# Patient Record
Sex: FEMALE | Race: WHITE
[De-identification: names, ages, dates, MRNs, and addresses within clinical notes are randomized per-mention and may not be internally consistent; named-entity substitution may affect disease eponyms.]

---

## 2022-07-27 ENCOUNTER — HOSPITAL ENCOUNTER (OUTPATIENT)
Dept: HOSPITAL 47 - LABWHC1 | Age: 62
Discharge: HOME | End: 2022-07-27
Attending: FAMILY MEDICINE
Payer: MEDICARE

## 2022-07-27 DIAGNOSIS — Z01.818: Primary | ICD-10-CM

## 2022-07-27 LAB
ALBUMIN SERPL-MCNC: 4.5 G/DL (ref 3.8–4.9)
ALBUMIN/GLOB SERPL: 2.25 G/DL (ref 1.6–3.17)
ALP SERPL-CCNC: 77 U/L (ref 41–126)
ALT SERPL-CCNC: 20 U/L (ref 8–44)
ANION GAP SERPL CALC-SCNC: 8.7 MMOL/L (ref 10–18)
APTT BLD: 24.7 SEC (ref 22–30)
AST SERPL-CCNC: 27 U/L (ref 13–35)
BASOPHILS # BLD AUTO: 0.05 X 10*3/UL (ref 0–0.1)
BASOPHILS NFR BLD AUTO: 0.6 %
BUN SERPL-SCNC: 12.1 MG/DL (ref 9–27)
BUN/CREAT SERPL: 17.29 RATIO (ref 12–20)
CALCIUM SPEC-MCNC: 9.6 MG/DL (ref 8.7–10.3)
CHLORIDE SERPL-SCNC: 101 MMOL/L (ref 96–109)
CO2 SERPL-SCNC: 26.3 MMOL/L (ref 20–27.5)
EOSINOPHIL # BLD AUTO: 0.17 X 10*3/UL (ref 0.04–0.35)
EOSINOPHIL NFR BLD AUTO: 2 %
ERYTHROCYTE [DISTWIDTH] IN BLOOD BY AUTOMATED COUNT: 4.09 X 10*6/UL (ref 4.1–5.2)
ERYTHROCYTE [DISTWIDTH] IN BLOOD: 12.9 % (ref 11.5–14.5)
GLOBULIN SER CALC-MCNC: 2 G/DL (ref 1.6–3.3)
GLUCOSE SERPL-MCNC: 145 MG/DL (ref 70–110)
HCT VFR BLD AUTO: 40.2 % (ref 37.2–46.3)
HGB BLD-MCNC: 13.2 G/DL (ref 12–15)
IMM GRANULOCYTES BLD QL AUTO: 0.2 %
INR PPP: 1 (ref ?–1.2)
LYMPHOCYTES # SPEC AUTO: 3.14 X 10*3/UL (ref 0.9–5)
LYMPHOCYTES NFR SPEC AUTO: 37.7 %
MCH RBC QN AUTO: 32.3 PG (ref 27–32)
MCHC RBC AUTO-ENTMCNC: 32.8 G/DL (ref 32–37)
MCV RBC AUTO: 98.3 FL (ref 80–97)
MONOCYTES # BLD AUTO: 0.53 X 10*3/UL (ref 0.2–1)
MONOCYTES NFR BLD AUTO: 6.4 %
NEUTROPHILS # BLD AUTO: 4.41 X 10*3/UL (ref 1.8–7.7)
NEUTROPHILS NFR BLD AUTO: 53.1 %
NRBC BLD AUTO-RTO: 0 /100 WBCS (ref 0–0)
PLATELET # BLD AUTO: 307 X 10*3/UL (ref 140–440)
POTASSIUM SERPL-SCNC: 4.3 MMOL/L (ref 3.5–5.5)
PROT SERPL-MCNC: 6.5 G/DL (ref 6.2–8.2)
PT BLD: 11.2 SEC (ref 9–12)
SODIUM SERPL-SCNC: 136 MMOL/L (ref 135–145)
WBC # BLD AUTO: 8.32 X 10*3/UL (ref 4.5–10)

## 2022-07-27 PROCEDURE — 36415 COLL VENOUS BLD VENIPUNCTURE: CPT

## 2022-07-27 PROCEDURE — 85610 PROTHROMBIN TIME: CPT

## 2022-07-27 PROCEDURE — 85730 THROMBOPLASTIN TIME PARTIAL: CPT

## 2022-07-27 PROCEDURE — 80053 COMPREHEN METABOLIC PANEL: CPT

## 2022-07-27 PROCEDURE — 85025 COMPLETE CBC W/AUTO DIFF WBC: CPT

## 2022-07-27 PROCEDURE — 71046 X-RAY EXAM CHEST 2 VIEWS: CPT

## 2022-07-27 NOTE — XR
EXAMINATION TYPE: XR chest 2V

 

DATE OF EXAM: 7/27/2022

 

COMPARISON: NONE

 

TECHNIQUE: PA and lateral views submitted.

 

HISTORY: Preop

 

FINDINGS:

The lungs are clear and  there is no pneumothorax, pleural effusion, or focal pneumonia.  Surgical ch
marty involving the vertebral column with scoliosis and multilevel degenerative disc disease. Heart si
ze normal with no overt failure.

 

IMPRESSION: 

1. No acute process.

## 2022-12-06 ENCOUNTER — HOSPITAL ENCOUNTER (OUTPATIENT)
Dept: HOSPITAL 47 - RADMRIMAIN | Age: 62
Discharge: HOME | End: 2022-12-06
Attending: ORTHOPAEDIC SURGERY
Payer: MEDICARE

## 2022-12-06 DIAGNOSIS — M25.552: Primary | ICD-10-CM

## 2022-12-07 NOTE — MR
EXAMINATION TYPE: MR hip LT wo con

 

DATE OF EXAM: 12/6/2022

 

COMPARISON: None

 

HISTORY: LEFT HIP PAIN

Multiplanar multi echo imaging of the pelvis and left hip without contrast.

 

The proximal femurs appear intact. There is no evidence of avascular necrosis. The acetabula. Intact.
 No free fluid in the pelvis. Bladder distends smoothly. No evidence of a pelvic mass. No evidence of
 any significant hip joint effusion. The soft tissues have fairly normal signal pattern. No evidence 
of soft tissue mass. No sign of a bowel obstruction. Sacroiliac joints appear normal.

 

IMPRESSION:

Negative MR scan of the pelvis and left hip.

## 2023-08-20 ENCOUNTER — HOSPITAL ENCOUNTER (EMERGENCY)
Dept: HOSPITAL 47 - EC | Age: 63
Discharge: HOME | End: 2023-08-20
Payer: MEDICARE

## 2023-08-20 VITALS
HEART RATE: 60 BPM | RESPIRATION RATE: 16 BRPM | SYSTOLIC BLOOD PRESSURE: 174 MMHG | DIASTOLIC BLOOD PRESSURE: 96 MMHG | TEMPERATURE: 98 F

## 2023-08-20 DIAGNOSIS — F17.200: ICD-10-CM

## 2023-08-20 DIAGNOSIS — Z88.0: ICD-10-CM

## 2023-08-20 DIAGNOSIS — S05.02XA: Primary | ICD-10-CM
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
## 2023-08-20 DIAGNOSIS — I10: ICD-10-CM

## 2023-08-20 PROCEDURE — 99283 EMERGENCY DEPT VISIT LOW MDM: CPT

## 2023-08-20 NOTE — ED
Eye Problem HPI





- General


Chief complaint: Eye Problems


Stated complaint: left eye injury


Time Seen by Provider: 08/20/23 15:17


Source: patient, RN notes reviewed


Mode of arrival: ambulatory





- History of Present Illness


Initial comments: 


Patient is a 63-year-old  female presenting the emergency room with 

pain in her left eye which began after she had debris fly into her eye while 

weed whacking prior to her arrival to the emergency room. She typically wears 

glasses consequently her uncorrected vision is impaired but she reports no 

changes from her chronic visual impairment. She has not brought her glasses with

her for visual acuity to be completed with her glasses on. She denies any injury

to any other location.





- Related Data


                                    Allergies











Allergy/AdvReac Type Severity Reaction Status Date / Time


 


Penicillins Allergy  Rash/Hives Verified 08/20/23 15:03














Review of Systems


ROS Statement: 


Those systems with pertinent positive or pertinent negative responses have been 

documented in the HPI.





ROS Other: All systems not noted in ROS Statement are negative.





Past Medical History


Past Medical History: Hypertension


History of Any Multi-Drug Resistant Organisms: None Reported


Past Surgical History: Adenoidectomy, Bladder Surgery, Tonsillectomy


Additional Past Surgical History / Comment(s): tina in back l1-l5


Past Psychological History: No Psychological Hx Reported


Smoking Status: Current every day smoker


Past Alcohol Use History: Occasional


Past Drug Use History: None Reported





General Exam


Limitations: no limitations


General appearance: alert, in no apparent distress


Head exam: Present: atraumatic, normocephalic, normal inspection


Eye exam: Present: EOMI, conjunctival injection, other (Corneal abrasion to left

cornea approximately 9:00. No foreign body.).  Absent: nystagmus


  ** Expanded


Eyelids: Erythema: Left (Lower), Swelling: Left (Lower)


Pupils: Regular, Round: Bilateral


Visual acuity (R) = 20/: 25


Visual acuity (L) = 20/: 40


With correction: No (Wears glas)


ENT exam: Present: mucous membranes moist


Neck exam: Present: normal inspection, full ROM


Respiratory exam: Absent: respiratory distress, accessory muscle use


Cardiovascular Exam: Present: regular rate


Extremities exam: Present: normal inspection


Back exam: Present: normal inspection


Neurological exam: Present: alert, oriented X3, CN II-XII intact


Psychiatric exam: Present: normal affect, normal mood


Skin exam: Present: warm, dry, intact, normal color.  Absent: rash





Course


                                   Vital Signs











  08/20/23





  14:58


 


Temperature 98.7 F


 


Pulse Rate 75


 


Respiratory 18





Rate 


 


Blood Pressure 160/77


 


O2 Sat by Pulse 95





Oximetry 














Medical Decision Making





- Medical Decision Making


Was pt. sent in by a medical professional or institution (RAHAT Smith, NP, urgent 

care, hospital, or nursing home...) When possible be specific


@  -No


Did you speak to anyone other than the patient for history (EMS, parent, family,

police, friend...)? What history was obtained from this source 


@  -No


Did you review nursing and triage notes (agree or disagree)?  Why? 


@  -I reviewed and agree with nursing and triage notes


Were old charts reviewed (outside hosp., previous admission, EMS record, old 

EKG, old radiological studies, urgent care reports/EKG's, nursing home records)?

Report findings 


@  -No old charts were reviewed


Differential Diagnosis (chest pain, altered mental status, abdominal pain women,

abdominal pain men, vaginal bleeding, weakness, fever, dyspnea, syncope, 

headache, dizziness, GI bleed, back pain, seizure, CVA, palpatations, mental 

health, musculoskeletal)? 


@  -Differential eye pain:


Conjunctivitis, corneal abrasion, foreign body in the cornea, acute iritis, 

periorbital cellulitis this is not meant to be an all-inclusive list. 





EKG interpreted by me (3pts min.).


@  -None done


X-rays interpreted by me (1pt min.).


@  -None done


CT interpreted by me (1pt min.).


@  -None done


U/S interpreted by me (1pt. min.).


@  -None done


What testing was considered but not performed or refused? (CT, X-rays, U/S, 

labs)? Why?


@  -None


What meds were considered but not given or refused? Why?


@  -None


Did you discuss the management of the patient with other professionals 

(professionals i.e. RAHAT Smith, NP, lab, RT, psych nurse, , , 

teacher, , )? Give summary


@  -No


Was smoking cessation discussed for >3mins.?


@  -No


Was critical care preformed (if so, how long)?


@  -No


Were there social determinants of health that impacted care today? How? 

(Homelessness, low income, unemployed, alcoholism, drug addiction, patel

sportation, low edu. Level, literacy, decrease access to med. care, intermediate, 

rehab)?


@  -No


Was there de-escalation of care discussed even if they declined (Discuss DNR or 

withdrawal of care, Hospice)? DNR status


@  -No


What co-morbidities impacted this encounter? (DM, HTN, Smoking, COPD, CAD, 

Cancer, CVA, ARF, Chemo, Hep., AIDS, mental health diagnosis, sleep apnea, 

morbid obesity)?


@  -None


Was patient admitted / discharged? Hospital course, mention meds given and 

route, prescriptions, significant lab abnormalities, going to OR and other 

pertinent info.


@  -63-year-old  female presenting the emergency room with pain in her 

left eye which began after she had debris fly into her eye while weed whacking 

prior to her arrival to the emergency room. She typically wears glasses 

consequently her uncorrected vision is impaired but she reports no changes from 

her chronic visual impairment. Proparacaine and fluorescein administered 2 left 

eye without complication. Exam reveals corneal abrasion at 9:00 without any 

evidence of foreign body in the eye. Visual acuity impaired but at baseline. No 

indication for any diagnostic imaging, laboratory studies are other medication 

administration. Findings discussed with patient and spouse at bedside. Questions

and concerns answered. Return parameters to the emergency room discussed.





Will discharge home in stable condition with education regarding corneal 

abrasion to left eye advising follow-up with her ophthalmologist.


Undiagnosed new problem with uncertain prognosis?


@  -No


Drug Therapy requiring intensive monitoring for toxicity (Heparin, Nitro, Insu

kevin, Cardizem)?


@  -No


Were any procedures done?


@  -No


Diagnosis/symptom?


@  -Left corneal abrasion


Acute, or Chronic, or Acute on Chronic?


@  -Acute


Uncomplicated (without systemic symptoms) or Complicated (systemic symptoms)?


@  -Uncomplicated


Side effects of treatment?


@  -No


Exacerbation, Progression, or Severe Exacerbation?


@  -No


Poses a threat to life or bodily function? How? (Chest pain, USA, MI, pneumonia,

PE, COPD, DKA, ARF, appy, cholecystitis, CVA, Diverticulitis, Homicidal, 

Suicidal, threat to staff... and all critical care pts)


@  -No


Case discussed with Dr. Ramirez.





Disposition


Clinical Impression: 


 Corneal abrasion





Disposition: HOME SELF-CARE


Condition: Stable


Instructions (If sedation given, give patient instructions):  Corneal Abrasion 

(ED)


Additional Instructions: 


Keep eye well lubricated. Avoid rubbing near eyebrow. Please follow-up with your

ophthalmologist. Please return to the Emergency Department if symptoms worsen or

any other concerns.


Is patient prescribed a controlled substance at d/c from ED?: No


Referrals: 


Jeremiah Pichardo DO [Primary Care Provider] - 1-2 days


Time of Disposition: 15:49